# Patient Record
Sex: MALE | Race: BLACK OR AFRICAN AMERICAN | HISPANIC OR LATINO | Employment: UNEMPLOYED | URBAN - METROPOLITAN AREA
[De-identification: names, ages, dates, MRNs, and addresses within clinical notes are randomized per-mention and may not be internally consistent; named-entity substitution may affect disease eponyms.]

---

## 2018-03-09 ENCOUNTER — OFFICE VISIT (OUTPATIENT)
Dept: URGENT CARE | Facility: CLINIC | Age: 28
End: 2018-03-09
Payer: MEDICARE

## 2018-03-09 VITALS
WEIGHT: 162 LBS | SYSTOLIC BLOOD PRESSURE: 118 MMHG | TEMPERATURE: 100 F | OXYGEN SATURATION: 97 % | DIASTOLIC BLOOD PRESSURE: 70 MMHG | HEART RATE: 99 BPM

## 2018-03-09 DIAGNOSIS — R19.7 NAUSEA VOMITING AND DIARRHEA: Primary | ICD-10-CM

## 2018-03-09 DIAGNOSIS — R11.2 NAUSEA VOMITING AND DIARRHEA: Primary | ICD-10-CM

## 2018-03-09 LAB
CTP QC/QA: YES
FLUAV AG NPH QL: NEGATIVE
FLUBV AG NPH QL: NEGATIVE

## 2018-03-09 PROCEDURE — 99203 OFFICE O/P NEW LOW 30 MIN: CPT | Mod: S$GLB,,, | Performed by: NURSE PRACTITIONER

## 2018-03-09 PROCEDURE — 87804 INFLUENZA ASSAY W/OPTIC: CPT | Mod: QW,S$GLB,, | Performed by: NURSE PRACTITIONER

## 2018-03-09 RX ORDER — ONDANSETRON 4 MG/1
4 TABLET, ORALLY DISINTEGRATING ORAL EVERY 6 HOURS PRN
Qty: 20 TABLET | Refills: 0 | Status: SHIPPED | OUTPATIENT
Start: 2018-03-09 | End: 2018-03-14

## 2018-03-09 NOTE — LETTER
March 9, 2018      [facility] [address], [place] [address]  Phone: [phone]  Fax: [phone]       Patient: Semaj Spann   YOB: 1990  Date of Visit: 03/09/2018    To Whom It May Concern:    Tali Spann  was at [facility] on 03/09/2018. He may return to work/school on 3/11/18 with no restrictions. If you have any questions or concerns, or if I can be of further assistance, please do not hesitate to contact me.    Sincerely,    Jane Dotson NP

## 2018-03-10 NOTE — PROGRESS NOTES
Subjective:       Patient ID: Semaj Spann is a 27 y.o. male.    Vitals:  weight is 73.5 kg (162 lb). His oral temperature is 99.5 °F (37.5 °C). His blood pressure is 118/70 and his pulse is 99. His oxygen saturation is 97%.     Chief Complaint: URI    Pt reports eating a cheesy egg omelet yesterday that had sat out all day.  Pt reports feeling sick shortly after eating the omelet and having diarrhea and vomiting all day today and last night.      URI    This is a new problem. The current episode started yesterday. The problem has been gradually worsening. There has been no fever. Associated symptoms include diarrhea, headaches, nausea and vomiting. Pertinent negatives include no abdominal pain, chest pain, congestion, coughing, ear pain, sore throat or wheezing. He has tried nothing for the symptoms.     Review of Systems   Constitution: Positive for chills and malaise/fatigue. Negative for fever.   HENT: Negative for congestion, ear pain, hoarse voice and sore throat.    Eyes: Negative for discharge and redness.   Cardiovascular: Negative for chest pain, dyspnea on exertion and leg swelling.   Respiratory: Negative for cough, shortness of breath, sputum production and wheezing.    Musculoskeletal: Negative for myalgias.   Gastrointestinal: Positive for diarrhea, nausea and vomiting. Negative for abdominal pain.   Neurological: Positive for headaches.   All other systems reviewed and are negative.      Objective:      Physical Exam   Constitutional: He is oriented to person, place, and time. Vital signs are normal. He appears well-developed and well-nourished. He is cooperative.  Non-toxic appearance. He does not have a sickly appearance. He does not appear ill. No distress.   HENT:   Head: Normocephalic and atraumatic.   Right Ear: Hearing, tympanic membrane, external ear and ear canal normal.   Left Ear: Hearing, tympanic membrane, external ear and ear canal normal.   Nose: Nose normal.   Mouth/Throat: Uvula is  midline, oropharynx is clear and moist and mucous membranes are normal.   Eyes: Conjunctivae and lids are normal.   Cardiovascular: Normal rate, regular rhythm and normal heart sounds.    Pulmonary/Chest: Effort normal and breath sounds normal. No accessory muscle usage. No apnea, no tachypnea and no bradypnea. No respiratory distress. He has no decreased breath sounds. He has no wheezes. He has no rhonchi. He has no rales. He exhibits no tenderness.   Abdominal: Normal appearance and bowel sounds are normal. He exhibits no distension, no pulsatile liver, no fluid wave, no ascites, no pulsatile midline mass and no mass. There is no hepatosplenomegaly, splenomegaly or hepatomegaly. There is no tenderness. There is no rigidity, no rebound, no guarding, no CVA tenderness, no tenderness at McBurney's point and negative Jimenez's sign.   Neurological: He is alert and oriented to person, place, and time.   Skin: Skin is warm. He is not diaphoretic.   Psychiatric: He has a normal mood and affect. His behavior is normal.   Nursing note and vitals reviewed.      Assessment:       1. Nausea vomiting and diarrhea        Plan:         Nausea vomiting and diarrhea  -     POCT Influenza A/B  -     ondansetron (ZOFRAN-ODT) 4 MG TbDL; Take 1 tablet (4 mg total) by mouth every 6 (six) hours as needed.  Dispense: 20 tablet; Refill: 0      Discussed negative results of flu test with pt and oral rehydration.   [place] diet.

## 2018-03-10 NOTE — PATIENT INSTRUCTIONS
"Please return here or go to the Emergency Department for any concerns or worsening of condition.    Please follow up with your primary care doctor or specialist as needed or if you are not feeling better in 3-4 days.    If you  smoke, please stop smoking.  [place] Diet  Your healthcare provider may recommend a bland diet if you have an upset stomach. It consists of foods that are mild and easy to digest. It is better to eat small frequent meals rather than 3 large meals a day.    Beverages  OK: Fruit juices, non-caffeinated teas and coffee, non-carbonated cohen  Avoid: Carbonated beverage, caffeinated tea and coffee, all alcoholic beverages  Bread  OK: Refined white, wheat or rye bread, kandice or soda crackers, Lupe toast, plain rolls, bagels  Avoid: Whole-grain bread  Cereal  OK: Refined cereals: cooked or ready to eat  Avoid: Whole-grain cereals and granola, or those containing bran, seeds or nuts  Desserts  OK: Peanut butter and all others except those to "avoid"  Avoid: Chocolate, cocoa, coconut, popcorn, nuts, seeds, jam, marmalade  Fruits  OK: Canned, cooked, frozen or fresh fruits without seeds or tough skin  Avoid: Olives, skin and seeds of fruit  Meats  OK: All fresh or preserved meat, fish and fowl  Avoid: Any that are prepared with those spices to "avoid"  Cheese and eggs  OK: Eggs, cottage cheese, cream cheese, other cheeses  Avoid: All cheeses made with those spices to "avoid"  Potatoes and pasta  OK: Potato, rice, macaroni, noodles, spaghetti  Avoid: None  Soups  OK: All soups without heavy seasoning  Avoid: Soups made with those spices to "avoid"  Vegetables  OK: Canned, cooked, fresh or frozen mildly flavored vegetables without seeds, skins or coarse fiber  Avoid: Vegetables prepared with those spices to "avoid"; skin and seeds of vegetables and those with coarse fiber  Spices  OK: Salt, lemon and lime juice, vinegar, all extracts, tonja, cinnamon, thyme, mace, allspice, paprika  Avoid: Chili powder, " cloves, pepper, seed spices, garlic, gravy pickles, highly seasoned salad dressings  Date Last Reviewed: 11/20/2015  © 6932-6847 [employer]. [address] [place] [address], [place] [address]. All rights reserved. This information is not intended as a substitute for professional medical care. Always follow your healthcare professional's instructions.

## 2021-12-28 ENCOUNTER — HOSPITAL ENCOUNTER (EMERGENCY)
Facility: HOSPITAL | Age: 31
Discharge: PSYCHIATRIC OR SUBSTANCE USE FACILITY | End: 2021-12-30
Attending: EMERGENCY MEDICINE
Payer: MEDICARE

## 2021-12-28 DIAGNOSIS — F29 PSYCHOSIS: ICD-10-CM

## 2021-12-28 DIAGNOSIS — F31.9 BIPOLAR 1 DISORDER: Primary | ICD-10-CM

## 2021-12-28 LAB
ALBUMIN SERPL BCP-MCNC: 3.6 G/DL (ref 3.5–5.2)
ALP SERPL-CCNC: 70 U/L (ref 55–135)
ALT SERPL W/O P-5'-P-CCNC: 25 U/L (ref 10–44)
ANION GAP SERPL CALC-SCNC: 4 MMOL/L (ref 8–16)
APAP SERPL-MCNC: <3 UG/ML (ref 10–20)
AST SERPL-CCNC: 22 U/L (ref 10–40)
BASOPHILS # BLD AUTO: 0.05 K/UL (ref 0–0.2)
BASOPHILS NFR BLD: 0.7 % (ref 0–1.9)
BILIRUB SERPL-MCNC: 0.4 MG/DL (ref 0.1–1)
BUN SERPL-MCNC: 11 MG/DL (ref 6–20)
CALCIUM SERPL-MCNC: 8.8 MG/DL (ref 8.7–10.5)
CHLORIDE SERPL-SCNC: 108 MMOL/L (ref 95–110)
CO2 SERPL-SCNC: 27 MMOL/L (ref 23–29)
CREAT SERPL-MCNC: 0.9 MG/DL (ref 0.5–1.4)
CTP QC/QA: YES
DIFFERENTIAL METHOD: ABNORMAL
EOSINOPHIL # BLD AUTO: 0.3 K/UL (ref 0–0.5)
EOSINOPHIL NFR BLD: 4.1 % (ref 0–8)
ERYTHROCYTE [DISTWIDTH] IN BLOOD BY AUTOMATED COUNT: 12.5 % (ref 11.5–14.5)
EST. GFR  (AFRICAN AMERICAN): >60 ML/MIN/1.73 M^2
EST. GFR  (NON AFRICAN AMERICAN): >60 ML/MIN/1.73 M^2
ETHANOL SERPL-MCNC: <10 MG/DL
GLUCOSE SERPL-MCNC: 94 MG/DL (ref 70–110)
HCT VFR BLD AUTO: 38.2 % (ref 40–54)
HGB BLD-MCNC: 12.5 G/DL (ref 14–18)
IMM GRANULOCYTES # BLD AUTO: 0.02 K/UL (ref 0–0.04)
IMM GRANULOCYTES NFR BLD AUTO: 0.3 % (ref 0–0.5)
LYMPHOCYTES # BLD AUTO: 2.1 K/UL (ref 1–4.8)
LYMPHOCYTES NFR BLD: 30.7 % (ref 18–48)
MCH RBC QN AUTO: 29.5 PG (ref 27–31)
MCHC RBC AUTO-ENTMCNC: 32.7 G/DL (ref 32–36)
MCV RBC AUTO: 90 FL (ref 82–98)
MONOCYTES # BLD AUTO: 0.8 K/UL (ref 0.3–1)
MONOCYTES NFR BLD: 12 % (ref 4–15)
NEUTROPHILS # BLD AUTO: 3.6 K/UL (ref 1.8–7.7)
NEUTROPHILS NFR BLD: 52.2 % (ref 38–73)
NRBC BLD-RTO: 0 /100 WBC
PLATELET # BLD AUTO: 186 K/UL (ref 150–450)
PMV BLD AUTO: 9.6 FL (ref 9.2–12.9)
POTASSIUM SERPL-SCNC: 4.3 MMOL/L (ref 3.5–5.1)
PROT SERPL-MCNC: 6.6 G/DL (ref 6–8.4)
RBC # BLD AUTO: 4.24 M/UL (ref 4.6–6.2)
SARS-COV-2 RDRP RESP QL NAA+PROBE: NEGATIVE
SODIUM SERPL-SCNC: 139 MMOL/L (ref 136–145)
TSH SERPL DL<=0.005 MIU/L-ACNC: 0.48 UIU/ML (ref 0.4–4)
WBC # BLD AUTO: 6.81 K/UL (ref 3.9–12.7)

## 2021-12-28 PROCEDURE — 82077 ASSAY SPEC XCP UR&BREATH IA: CPT | Performed by: EMERGENCY MEDICINE

## 2021-12-28 PROCEDURE — 96374 THER/PROPH/DIAG INJ IV PUSH: CPT

## 2021-12-28 PROCEDURE — 25000003 PHARM REV CODE 250: Performed by: EMERGENCY MEDICINE

## 2021-12-28 PROCEDURE — 99285 EMERGENCY DEPT VISIT HI MDM: CPT | Mod: 25

## 2021-12-28 PROCEDURE — 80053 COMPREHEN METABOLIC PANEL: CPT | Performed by: EMERGENCY MEDICINE

## 2021-12-28 PROCEDURE — 96372 THER/PROPH/DIAG INJ SC/IM: CPT | Mod: 59

## 2021-12-28 PROCEDURE — 84443 ASSAY THYROID STIM HORMONE: CPT | Performed by: EMERGENCY MEDICINE

## 2021-12-28 PROCEDURE — U0002 COVID-19 LAB TEST NON-CDC: HCPCS | Performed by: EMERGENCY MEDICINE

## 2021-12-28 PROCEDURE — 63600175 PHARM REV CODE 636 W HCPCS: Performed by: EMERGENCY MEDICINE

## 2021-12-28 PROCEDURE — 80143 DRUG ASSAY ACETAMINOPHEN: CPT | Performed by: EMERGENCY MEDICINE

## 2021-12-28 PROCEDURE — 85025 COMPLETE CBC W/AUTO DIFF WBC: CPT | Performed by: EMERGENCY MEDICINE

## 2021-12-28 RX ORDER — ARIPIPRAZOLE 20 MG/1
20 TABLET ORAL NIGHTLY PRN
COMMUNITY
Start: 2021-12-07

## 2021-12-28 RX ORDER — ALPRAZOLAM 0.25 MG/1
1 TABLET ORAL
Status: COMPLETED | OUTPATIENT
Start: 2021-12-28 | End: 2021-12-28

## 2021-12-28 RX ORDER — DROPERIDOL 2.5 MG/ML
5 INJECTION, SOLUTION INTRAMUSCULAR; INTRAVENOUS
Status: COMPLETED | OUTPATIENT
Start: 2021-12-28 | End: 2021-12-28

## 2021-12-28 RX ORDER — ARIPIPRAZOLE 5 MG/1
20 TABLET ORAL
Status: COMPLETED | OUTPATIENT
Start: 2021-12-28 | End: 2021-12-28

## 2021-12-28 RX ORDER — QUETIAPINE FUMARATE 100 MG/1
100 TABLET, FILM COATED ORAL NIGHTLY
COMMUNITY
Start: 2021-12-07

## 2021-12-28 RX ORDER — QUETIAPINE FUMARATE 25 MG/1
100 TABLET, FILM COATED ORAL
Status: COMPLETED | OUTPATIENT
Start: 2021-12-28 | End: 2021-12-28

## 2021-12-28 RX ORDER — OLANZAPINE 5 MG/1
10 TABLET, ORALLY DISINTEGRATING ORAL
Status: ACTIVE | OUTPATIENT
Start: 2021-12-28 | End: 2021-12-29

## 2021-12-28 RX ADMIN — QUETIAPINE FUMARATE 100 MG: 25 TABLET ORAL at 08:12

## 2021-12-28 RX ADMIN — ARIPIPRAZOLE 20 MG: 5 TABLET ORAL at 08:12

## 2021-12-28 RX ADMIN — DROPERIDOL 5 MG: 2.5 INJECTION, SOLUTION INTRAMUSCULAR; INTRAVENOUS at 10:12

## 2021-12-28 RX ADMIN — ALPRAZOLAM 1 MG: 0.25 TABLET ORAL at 08:12

## 2021-12-29 LAB
AMPHET+METHAMPHET UR QL: NEGATIVE
BARBITURATES UR QL SCN>200 NG/ML: NEGATIVE
BENZODIAZ UR QL SCN>200 NG/ML: ABNORMAL
BILIRUB UR QL STRIP: NEGATIVE
BZE UR QL SCN: ABNORMAL
CANNABINOIDS UR QL SCN: ABNORMAL
CLARITY UR REFRACT.AUTO: ABNORMAL
COLOR UR AUTO: YELLOW
CREAT UR-MCNC: 150 MG/DL (ref 23–375)
GLUCOSE UR QL STRIP: NEGATIVE
HGB UR QL STRIP: NEGATIVE
KETONES UR QL STRIP: NEGATIVE
LEUKOCYTE ESTERASE UR QL STRIP: NEGATIVE
METHADONE UR QL SCN>300 NG/ML: NEGATIVE
NITRITE UR QL STRIP: NEGATIVE
OPIATES UR QL SCN: NEGATIVE
PCP UR QL SCN>25 NG/ML: NEGATIVE
PH UR STRIP: 7 [PH] (ref 5–8)
PROT UR QL STRIP: NEGATIVE
SP GR UR STRIP: 1.02 (ref 1–1.03)
TOXICOLOGY INFORMATION: ABNORMAL
URN SPEC COLLECT METH UR: ABNORMAL

## 2021-12-29 PROCEDURE — 80307 DRUG TEST PRSMV CHEM ANLYZR: CPT | Performed by: EMERGENCY MEDICINE

## 2021-12-29 PROCEDURE — 63600175 PHARM REV CODE 636 W HCPCS

## 2021-12-29 PROCEDURE — 93005 ELECTROCARDIOGRAM TRACING: CPT

## 2021-12-29 PROCEDURE — 63600175 PHARM REV CODE 636 W HCPCS: Performed by: EMERGENCY MEDICINE

## 2021-12-29 PROCEDURE — 81003 URINALYSIS AUTO W/O SCOPE: CPT | Performed by: EMERGENCY MEDICINE

## 2021-12-29 PROCEDURE — 93010 EKG 12-LEAD: ICD-10-PCS | Mod: ,,, | Performed by: INTERNAL MEDICINE

## 2021-12-29 PROCEDURE — 96372 THER/PROPH/DIAG INJ SC/IM: CPT

## 2021-12-29 PROCEDURE — 93010 ELECTROCARDIOGRAM REPORT: CPT | Mod: ,,, | Performed by: INTERNAL MEDICINE

## 2021-12-29 RX ORDER — OLANZAPINE 10 MG/2ML
10 INJECTION, POWDER, FOR SOLUTION INTRAMUSCULAR
Status: DISCONTINUED | OUTPATIENT
Start: 2021-12-29 | End: 2021-12-30 | Stop reason: HOSPADM

## 2021-12-29 RX ORDER — DIPHENHYDRAMINE HYDROCHLORIDE 50 MG/ML
25 INJECTION INTRAMUSCULAR; INTRAVENOUS
Status: COMPLETED | OUTPATIENT
Start: 2021-12-29 | End: 2021-12-29

## 2021-12-29 RX ORDER — DIPHENHYDRAMINE HYDROCHLORIDE 50 MG/ML
INJECTION INTRAMUSCULAR; INTRAVENOUS
Status: COMPLETED
Start: 2021-12-29 | End: 2021-12-30

## 2021-12-29 RX ORDER — LORAZEPAM 2 MG/ML
2 INJECTION INTRAMUSCULAR
Status: COMPLETED | OUTPATIENT
Start: 2021-12-29 | End: 2021-12-29

## 2021-12-29 RX ORDER — HALOPERIDOL 5 MG/ML
5 INJECTION INTRAMUSCULAR
Status: COMPLETED | OUTPATIENT
Start: 2021-12-29 | End: 2021-12-29

## 2021-12-29 RX ORDER — LORAZEPAM 2 MG/ML
INJECTION INTRAMUSCULAR
Status: COMPLETED
Start: 2021-12-29 | End: 2021-12-29

## 2021-12-29 RX ORDER — HALOPERIDOL 5 MG/ML
INJECTION INTRAMUSCULAR
Status: COMPLETED
Start: 2021-12-29 | End: 2021-12-29

## 2021-12-29 RX ADMIN — DIPHENHYDRAMINE HYDROCHLORIDE 25 MG: 50 INJECTION, SOLUTION INTRAMUSCULAR; INTRAVENOUS at 08:12

## 2021-12-29 RX ADMIN — LORAZEPAM 2 MG: 2 INJECTION INTRAMUSCULAR; INTRAVENOUS at 04:12

## 2021-12-29 RX ADMIN — LORAZEPAM 2 MG: 2 INJECTION INTRAMUSCULAR at 04:12

## 2021-12-29 RX ADMIN — HALOPERIDOL LACTATE 5 MG: 5 INJECTION, SOLUTION INTRAMUSCULAR at 08:12

## 2021-12-29 RX ADMIN — HALOPERIDOL 5 MG: 5 INJECTION INTRAMUSCULAR at 04:12

## 2021-12-29 RX ADMIN — LORAZEPAM 2 MG: 2 INJECTION INTRAMUSCULAR; INTRAVENOUS at 08:12

## 2021-12-29 RX ADMIN — HALOPERIDOL LACTATE 5 MG: 5 INJECTION, SOLUTION INTRAMUSCULAR at 04:12

## 2021-12-29 NOTE — ED NOTES
Pt continues to spit and be aggressive towards security and staff. Pt given haldol 5 mg IM as ordered. Pt sitting up in bed, pulling his arm band off and spitting everywhere in room.

## 2021-12-29 NOTE — PROVIDER PROGRESS NOTES - EMERGENCY DEPT.
"Signout Note    I received signout from the previous provider.     Chief complaint:  Psychiatric Evaluation (Patient presents from home for further evaluation of aggressive behavior. Patient denies SI/HI. Patient with cousin who reports that patient with aggressive behavior and his "mother is afraid to have him at home". Patient verbally aggressive during triage process. )      Pertinent history &exam:  Semaj Spann is a 31 y.o. male who presented to emergency department with complaint of acute psychosis.  Signed out pending medical clearance for psychiatric placement.    Vitals:    12/30/21 0600   BP: 112/72   Pulse: 62   Resp: 16   Temp:        Imaging Studies:    No orders to display       Medications Given:  Medications   olanzapine zydis disintegrating tablet 10 mg (10 mg Oral Not Given 12/28/21 1946)   QUEtiapine tablet 100 mg (100 mg Oral Given 12/28/21 2017)   ARIPiprazole tablet 20 mg (20 mg Oral Given 12/28/21 2016)   ALPRAZolam tablet 1 mg (1 mg Oral Given 12/28/21 2017)   droperidoL injection 5 mg (5 mg Intramuscular Given 12/28/21 2248)   haloperidol lactate injection 5 mg (5 mg Intramuscular Given 12/29/21 0837)   lorazepam injection 2 mg (2 mg Intramuscular Given 12/29/21 0853)   diphenhydrAMINE injection 25 mg (25 mg Intramuscular Given 12/29/21 0853)   haloperidol lactate injection 5 mg (5 mg Intramuscular Given 12/29/21 1632)   lorazepam injection 2 mg (2 mg Intramuscular Given 12/29/21 1633)   diphenhydrAMINE (BENADRYL) 50 mg/mL injection (50 mg  Given 12/30/21 0237)   lorazepam injection 2 mg (2 mg Intravenous Given 12/30/21 0236)       Pending Items/ MDM:  31 y.o. male with acute psychosis.  Pec filed by previous physician.  Labs reviewed.  No medical reason that precludes this patient's transfer to psychiatric facility.    Diagnostic Impression:    1. Bipolar 1 disorder    2. Psychosis         ED Disposition Condition    Transfer to Psych Facility         ED Prescriptions     None    "     Follow-up Information    None         Jane Verdin MD  Emergency Medicine

## 2021-12-29 NOTE — ED NOTES
Pt not wanting to stay in room. Security and tech at bedside, charge nurse in to speak with pt. Order for haldol received, pt says he will stay in room, and go to transfer when ride arrives. At this time haldol not given. Will continue to closely monitor.

## 2021-12-29 NOTE — ED TRIAGE NOTES
"Semaj Spann, a 31 y.o. male presents to the ED w/ complaint of aggressive bx; pt unwilling to answer questions with nurse    Triage note:  Chief Complaint   Patient presents with    Psychiatric Evaluation     Patient presents from home for further evaluation of aggressive behavior. Patient denies SI/HI. Patient with cousin who reports that patient with aggressive behavior and his "mother is afraid to have him at home". Patient verbally aggressive during triage process.      Review of patient's allergies indicates:  No Known Allergies  Past Medical History:   Diagnosis Date    Bipolar 1 disorder      "

## 2021-12-29 NOTE — ED PROVIDER NOTES
"Encounter Date: 12/28/2021       History     Chief Complaint   Patient presents with    Psychiatric Evaluation     Patient presents from home for further evaluation of aggressive behavior. Patient denies SI/HI. Patient with cousin who reports that patient with aggressive behavior and his "mother is afraid to have him at home". Patient verbally aggressive during triage process.      31-year-old male with history of bipolar disorder is brought in by family because he is too aggressive and out-of-control at home.  His mother is scared to have him at home.  He gets like this periodically and require psychiatric admission.  Patient was verbally aggressive while in the department.  He was threatening the ED staff.  When I saw the patient he states he is calming down.        Review of patient's allergies indicates:  No Known Allergies  Past Medical History:   Diagnosis Date    Bipolar 1 disorder      No past surgical history on file.  Family History   Problem Relation Age of Onset    Mental illness Maternal Uncle     Diabetes Maternal Grandmother      Social History     Tobacco Use    Smoking status: Current Every Day Smoker     Packs/day: 1.00     Types: Cigarettes    Smokeless tobacco: Never Used   Substance Use Topics    Alcohol use: No    Drug use: Yes     Types: Marijuana     Review of Systems   Constitutional: Negative for chills and fever.   HENT: Negative for nosebleeds.    Eyes: Negative for visual disturbance.   Respiratory: Negative for shortness of breath.    Cardiovascular: Negative for chest pain.   Gastrointestinal: Negative for vomiting.   Genitourinary: Negative for frequency.   Musculoskeletal: Negative for joint swelling.   Skin: Negative for rash.   Neurological: Negative for numbness.   Hematological: Does not bruise/bleed easily.   Psychiatric/Behavioral: Negative for confusion.       Physical Exam     Initial Vitals [12/28/21 1822]   BP Pulse Resp Temp SpO2   134/61 89 17 98.9 °F (37.2 °C) 99 " %      MAP       --         Physical Exam    Constitutional: He appears well-developed and well-nourished.   Patient appears agitated and upset.  His thoughts are tangential.   HENT:   Head: Normocephalic and atraumatic.   Mouth/Throat: Oropharynx is clear and moist.   Eyes: Conjunctivae and EOM are normal. Pupils are equal, round, and reactive to light. Right eye exhibits no discharge. Left eye exhibits no discharge. No scleral icterus.   Neck: Neck supple. No JVD present.   Normal range of motion.  Cardiovascular: Normal rate, regular rhythm, normal heart sounds and intact distal pulses. Exam reveals no friction rub.    No murmur heard.  Pulmonary/Chest: Breath sounds normal. No stridor. No respiratory distress. He has no wheezes. He has no rhonchi. He has no rales.   Abdominal: Abdomen is soft. Bowel sounds are normal. He exhibits no distension and no mass. There is no abdominal tenderness. There is no rebound and no guarding.   Musculoskeletal:         General: No tenderness or edema. Normal range of motion.      Cervical back: Normal range of motion and neck supple.     Lymphadenopathy:     He has no cervical adenopathy.   Neurological: He is alert. He has normal strength. No cranial nerve deficit or sensory deficit. GCS score is 15. GCS eye subscore is 4. GCS verbal subscore is 5. GCS motor subscore is 6.   Skin: Skin is warm. Capillary refill takes less than 2 seconds. No rash noted.         ED Course   Procedures  Labs Reviewed   CBC W/ AUTO DIFFERENTIAL - Abnormal; Notable for the following components:       Result Value    RBC 4.24 (*)     Hemoglobin 12.5 (*)     Hematocrit 38.2 (*)     All other components within normal limits   COMPREHENSIVE METABOLIC PANEL - Abnormal; Notable for the following components:    Anion Gap 4 (*)     All other components within normal limits   ACETAMINOPHEN LEVEL - Abnormal; Notable for the following components:    Acetaminophen (Tylenol), Serum <3.0 (*)     All other  components within normal limits   TSH   ALCOHOL,MEDICAL (ETHANOL)   DRUG SCREEN PANEL, URINE EMERGENCY   SARS-COV-2 RDRP GENE    Narrative:     This test utilizes isothermal nucleic acid amplification   technology to detect the SARS-CoV-2 RdRp nucleic acid segment.   The analytical sensitivity (limit of detection) is 125 genome   equivalents/mL.   A POSITIVE result implies infection with the SARS-CoV-2 virus;   the patient is presumed to be contagious.     A NEGATIVE result means that SARS-CoV-2 nucleic acids are not   present above the limit of detection. A NEGATIVE result should be   treated as presumptive. It does not rule out the possibility of   COVID-19 and should not be the sole basis for treatment decisions.   If COVID-19 is strongly suspected based on clinical and exposure   history, re-testing using an alternate molecular assay should be   considered.   This test is only for use under the Food and Drug   Administration s Emergency Use Authorization (EUA).   Commercial kits are provided by [employer].   Performance characteristics of the EUA have been independently   verified by [facility] Department of   Pathology and Laboratory Medicine.   _________________________________________________________________   The authorized Fact Sheet for Healthcare Providers and the authorized Fact   Sheet for Patients of the ID NOW COVID-19 are available on the FDA   website:     https://www.fda.gov/media/126371/download  https://www.fda.gov/media/718543/download                Imaging Results    None          Medications   olanzapine zydis disintegrating tablet 10 mg (10 mg Oral Not Given 12/28/21 1946)   QUEtiapine tablet 100 mg (100 mg Oral Given 12/28/21 2017)   ARIPiprazole tablet 20 mg (20 mg Oral Given 12/28/21 2016)   ALPRAZolam tablet 1 mg (1 mg Oral Given 12/28/21 2017)     Medical Decision Making:   History:   Old Medical Records: I decided to obtain old medical records.  Initial Assessment:    Patient with bipolar disorder this seems to be becoming psychotic.  I wonder if he has had poor compliance with his medication.  Will give a dose of Zyprexa.  He is unsafe to be at home right now.  Will place under a pec.  I attempted to contact family several times without success.  I did review his medical record and he had had a similar interaction that benefit from hospitalization.  Clinical Tests:   Lab Tests: Ordered and Reviewed                 Medically cleared for psychiatry placement: 12/28/2021  9:24 PM    Clinical Impression:   Final diagnoses:  [F31.9] Bipolar 1 disorder (Primary)          ED Disposition Condition    Transfer to Psych Facility         ED Prescriptions     None        Follow-up Information    None          Ian Kaur MD  12/28/21 0256

## 2021-12-29 NOTE — ED NOTES
Pt keeps spitting on the floor, pt offered a emesis bag to spit and he says he won't spit on the floor anymore.

## 2021-12-29 NOTE — ED NOTES
"Reminded pt about needing to provide urine sample. Pt given new urinal. Pt threw urinal at wall and said "good luck, I'm not gonna piss for you".   "

## 2021-12-29 NOTE — ED NOTES
Pt resting calmly in bed with eyes closed. Chest rise and fall noted. Respirations E/UL. NAD noted.   ED Holden merlos, at bedside under direct observation doing q15min assessments. Will continue to monitor

## 2021-12-29 NOTE — ED NOTES
"Pt threw trash can, ripped open his paper scrubs in crotch and saying "suck my shilpa" as he grabs himself. Pt continues to spit. Pt placed in restraints at this time. Dr. Barrett aware, spit mask placed on pt, medications being prepared for administration as ordered.   "

## 2021-12-29 NOTE — ED NOTES
Received bedside report from PEPPER Oh. Assumed care of pt at this time.   Pt resting in bed restlessly at this time. Pt remains in paper scrubs. Respirations E/UL. NAD noted. Stretcher remains locked and in lowest positions. Side rails up x2. Pt updated on POC.   ED sitHolden wu, at bedside under direct observation doing q15min assessments. Will continue to monitor.

## 2021-12-29 NOTE — ED NOTES
Pt resting calmly in bed with eyes closed. Chest rise and fall noted. Respirations E/UL. NAD noted.   ED Deborah merlos, at bedside under direct observation doing q15min assessments. Will continue to monitor

## 2021-12-30 VITALS
SYSTOLIC BLOOD PRESSURE: 112 MMHG | BODY MASS INDEX: 23.54 KG/M2 | HEART RATE: 62 BPM | HEIGHT: 67 IN | OXYGEN SATURATION: 100 % | TEMPERATURE: 98 F | DIASTOLIC BLOOD PRESSURE: 72 MMHG | RESPIRATION RATE: 16 BRPM | WEIGHT: 150 LBS

## 2021-12-30 PROCEDURE — 99291 PR CRITICAL CARE, E/M 30-74 MINUTES: ICD-10-PCS | Mod: ,,, | Performed by: EMERGENCY MEDICINE

## 2021-12-30 PROCEDURE — 63600175 PHARM REV CODE 636 W HCPCS

## 2021-12-30 PROCEDURE — 99291 CRITICAL CARE FIRST HOUR: CPT | Mod: ,,, | Performed by: EMERGENCY MEDICINE

## 2021-12-30 PROCEDURE — 96374 THER/PROPH/DIAG INJ IV PUSH: CPT

## 2021-12-30 PROCEDURE — 63600175 PHARM REV CODE 636 W HCPCS: Performed by: EMERGENCY MEDICINE

## 2021-12-30 RX ORDER — DIPHENHYDRAMINE HYDROCHLORIDE 50 MG/ML
50 INJECTION INTRAMUSCULAR; INTRAVENOUS
Status: DISCONTINUED | OUTPATIENT
Start: 2021-12-30 | End: 2021-12-30 | Stop reason: HOSPADM

## 2021-12-30 RX ORDER — LORAZEPAM 2 MG/ML
2 INJECTION INTRAMUSCULAR
Status: COMPLETED | OUTPATIENT
Start: 2021-12-30 | End: 2021-12-30

## 2021-12-30 RX ADMIN — LORAZEPAM 2 MG: 2 INJECTION INTRAMUSCULAR; INTRAVENOUS at 02:12

## 2021-12-30 RX ADMIN — DIPHENHYDRAMINE HYDROCHLORIDE 50 MG: 50 INJECTION INTRAMUSCULAR; INTRAVENOUS at 02:12

## 2021-12-30 NOTE — ED NOTES
Pt remains in paper scrubs, resting in stretcher comfortably - with side rails up, locked, and in lowest position. Chest rise and fall noted; breathing equal, even, and unlabored. Sitter remains at bedside in direct visual contact, charting per protocol every 15 minutes. No equipment or belongings are in the patients room to prevent self harm or injury. Pt aware of plan of care; instructed patient that violent restraints will need to be removed for 4 hours before patient can be transported to facility. No acute distress noted and no needs expressed at this time. Zyprexa held at this time due to compliance and reduced aggressiveness. Will continue to assess periodically.

## 2021-12-30 NOTE — ED NOTES
Patient at doorway of room, speaking with sitter and officers. Patient keeps asking to see the doctor, even though MD Rick has already spoken with him. Patient calmed after reassuring pt he would get an Ensure drink.

## 2021-12-30 NOTE — ED NOTES
"Patient awake again, standing by door trying to leave. Patient will cooperate when security or another RN is in the room. However, the second no one is in the room trying to redirect him, patient gets back out of bed trying to cause issues. Patient expresses feelings of boredom "All I do is eat, sleep, and lay here. I have a method to my madness."  "

## 2021-12-30 NOTE — ED NOTES
Pt remains in paper scrubs; patient continues to be restless - walking around room/stating he does not want to be here. Sitter remains at bedside in direct visual contact, charting per protocol every 15 minutes. No equipment or belongings are in the patients room to prevent self harm or injury. Pt aware of plan of care. No acute distress noted and no needs expressed at this time. Will continue to assess periodically.

## 2021-12-30 NOTE — ED NOTES
Patient transferred to [facility] calmly and cooperatively. Patient left hospital to ambulance in [facility]. One bag of charted belongings sent with patient.

## 2021-12-30 NOTE — ED NOTES
Patient calm and cooperative, expressing that he will maintain this way. Restraints removed from patient at this time. Holden Huerta, maintaining DVC, will continue to periodically assess patient.

## 2021-12-30 NOTE — ED NOTES
Received pt AAO.  Pt breathing E/U on room air. Patient remains in blue paper scrubs with sitter maintaining DVC. Patient aggressively rocking stretcher side to side, making loud banging noise. MD Shamar notified - will order zyprexa IM.

## 2021-12-30 NOTE — PROVIDER PROGRESS NOTES - EMERGENCY DEPT.
Encounter Date: 12/28/2021    ED Physician Progress Notes        Physician Note:   Received patient at sign-out    31-year-old male presents with acute psychosis.  He is gravely disabled.  He received multiple rounds of anti psychotics but remained agitated.  Patient is spitting at staff and combative.  Ativan and Benadryl administered. He remains in hard restraints.    Reassessment:  Patient agitated despite restraints.  Will administer olanzapine.    Reassessment:  Patient sedated at this time.  EKG reveals sinus bradycardia at a rate of 46 beats per minute.  Normal axis.  Normal ST segments.  Normal T-waves.  QTC is not prolonged.  At this time, my shift is coming to a close and patient was signed out to MD.

## 2021-12-30 NOTE — ED NOTES
Patient is a psych border in the department.  Became acutely agitated.  Had to be restrained.  Chemically sedated with Benadryl and Ativan.  Pending transfer with pec.  Attempted verbal deescalation which failed.    Critical Care:  Date: 12/30/2021  Performed by: Dr. Андрей Cabrera   Authorized by: Dr. Андрей Cabrera  Total critical care time (exclusive of procedural time) : 35 minutes  Critical care was necessary to treat or prevent imminent or life-threatening deterioration of the following conditions:  Acute agitation, chemical sedation     Андрей Cabrera MD  12/30/21 0231

## 2021-12-30 NOTE — ED NOTES
Pt remains in paper scrubs, resting in stretcher comfortably - with side rails up, locked, and in lowest position. Chest rise and fall noted; breathing equal, even, and unlabored. Sitter remains at bedside in direct visual contact, charting per protocol every 15 minutes. No equipment or belongings are in the patients room to prevent self harm or injury. Pt aware of plan of care. No acute distress noted and no needs expressed at this time. Will continue to assess periodically.

## 2021-12-30 NOTE — ED NOTES
Patient continues to come to door trying to leave. Patient given chocolate boost drink. Patient sitting on floor.

## 2021-12-30 NOTE — ED NOTES
Patient remains calm and cooperative, sleeping in stretcher in NAD after removal of restraints almost 1 hour prior. Patient states he is sleepy and just wants to sleep. Patient remains in paper scrubs, chest rise and fall noted.

## 2021-12-30 NOTE — ED NOTES
Patient remains in scrubs, standing in doorway; pt is pleasantly speaking with  and [facility] Patient in NAD.